# Patient Record
Sex: MALE | Race: BLACK OR AFRICAN AMERICAN | NOT HISPANIC OR LATINO
[De-identification: names, ages, dates, MRNs, and addresses within clinical notes are randomized per-mention and may not be internally consistent; named-entity substitution may affect disease eponyms.]

---

## 2021-06-16 ENCOUNTER — RX RENEWAL (OUTPATIENT)
Age: 71
End: 2021-06-16

## 2021-06-16 PROBLEM — Z00.00 ENCOUNTER FOR PREVENTIVE HEALTH EXAMINATION: Status: ACTIVE | Noted: 2021-06-16

## 2021-06-16 RX ORDER — DICLOFENAC SODIUM 75 MG/1
75 TABLET, DELAYED RELEASE ORAL
Qty: 60 | Refills: 0 | Status: ACTIVE | COMMUNITY
Start: 2021-06-16 | End: 1900-01-01

## 2021-09-09 ENCOUNTER — APPOINTMENT (OUTPATIENT)
Dept: PEDIATRIC ORTHOPEDIC SURGERY | Facility: CLINIC | Age: 71
End: 2021-09-09
Payer: MEDICARE

## 2021-09-09 VITALS — BODY MASS INDEX: 27.55 KG/M2 | WEIGHT: 186 LBS | HEIGHT: 69 IN

## 2021-09-09 DIAGNOSIS — Z82.49 FAMILY HISTORY OF ISCHEMIC HEART DISEASE AND OTHER DISEASES OF THE CIRCULATORY SYSTEM: ICD-10-CM

## 2021-09-09 DIAGNOSIS — M72.2 PLANTAR FASCIAL FIBROMATOSIS: ICD-10-CM

## 2021-09-09 PROCEDURE — 99212 OFFICE O/P EST SF 10 MIN: CPT

## 2021-09-09 PROCEDURE — 73630 X-RAY EXAM OF FOOT: CPT

## 2021-09-09 RX ORDER — MELOXICAM 15 MG/1
15 TABLET ORAL DAILY
Qty: 30 | Refills: 0 | Status: ACTIVE | COMMUNITY
Start: 2021-09-09 | End: 1900-01-01

## 2021-09-09 NOTE — HISTORY OF PRESENT ILLNESS
[FreeTextEntry1] : This 71-year-old male is here for evaluation of a 1 week history of significant pain in the left foot without history of trauma.  The patient does have a history of gout.  His foot though is not red or hot.  He does have some mild diffuse swelling with pain mostly in the plantar aspect of the foot.

## 2021-09-09 NOTE — DATA REVIEWED
[de-identified] : X-ray evaluation of the foot on 9/9/2021 (AP, lateral and oblique views) reveals a calcaneal heel spur with some mild diffuse degenerative changes.\par Indication for x-ray of the foot: To determine presence of arthritis or fracture

## 2021-09-09 NOTE — DATA REVIEWED
[de-identified] : X-ray evaluation of the foot on 9/9/2021 (AP, lateral and oblique views) reveals a calcaneal heel spur with some mild diffuse degenerative changes.\par Indication for x-ray of the foot: To determine presence of arthritis or fracture

## 2021-09-09 NOTE — ASSESSMENT
[FreeTextEntry1] : Plantar fasciitis left foot\par \par This patient will be treated with meloxicam.  If he is not improved in approximately 10 days he will return for possible cortisone injection.

## 2021-09-09 NOTE — PHYSICAL EXAM
[FreeTextEntry1] : On physical examination there is mild swelling in the dorsum of the foot.  There is swelling also in the plantar aspect of the foot with tenderness especially in the plantar aspect of the foot and heel.

## 2021-09-09 NOTE — DATA REVIEWED
[de-identified] : X-ray evaluation of the foot on 9/9/2021 (AP, lateral and oblique views) reveals a calcaneal heel spur with some mild diffuse degenerative changes.\par Indication for x-ray of the foot: To determine presence of arthritis or fracture

## 2021-09-09 NOTE — DATA REVIEWED
[de-identified] : X-ray evaluation of the foot on 9/9/2021 (AP, lateral and oblique views) reveals a calcaneal heel spur with some mild diffuse degenerative changes.\par Indication for x-ray of the foot: To determine presence of arthritis or fracture

## 2021-09-10 PROBLEM — Z82.49 FAMILY HISTORY OF HYPERTENSION: Status: ACTIVE | Noted: 2021-09-09

## 2023-06-27 ENCOUNTER — APPOINTMENT (OUTPATIENT)
Dept: PEDIATRIC ORTHOPEDIC SURGERY | Facility: CLINIC | Age: 73
End: 2023-06-27
Payer: MEDICARE

## 2023-06-27 VITALS — SYSTOLIC BLOOD PRESSURE: 201 MMHG | DIASTOLIC BLOOD PRESSURE: 88 MMHG

## 2023-06-27 VITALS — TEMPERATURE: 97.6 F | HEIGHT: 69 IN | BODY MASS INDEX: 27.55 KG/M2 | WEIGHT: 186 LBS

## 2023-06-27 PROCEDURE — 73630 X-RAY EXAM OF FOOT: CPT

## 2023-06-27 PROCEDURE — 99213 OFFICE O/P EST LOW 20 MIN: CPT

## 2023-06-27 RX ORDER — NAPROXEN 375 MG/1
375 TABLET, DELAYED RELEASE ORAL TWICE DAILY
Qty: 60 | Refills: 1 | Status: ACTIVE | COMMUNITY
Start: 2023-06-27 | End: 1900-01-01

## 2023-06-27 RX ORDER — PROBENECID AND COLCHICINE 500; .5 MG/1; MG/1
0.5-5 TABLET ORAL
Qty: 30 | Refills: 1 | Status: ACTIVE | COMMUNITY
Start: 2023-06-27 | End: 1900-01-01

## 2023-06-27 NOTE — PHYSICAL EXAM
[de-identified] : Exam today reveals a very antalgic gait with limp on the left side.  He walks with an externally rotated gait to avoid pushoff.  He has good motion to the ankle and subtalar joint as well as to his toes but he does have obvious swelling and diffuse pain to the forefoot both dorsally as well as on the sole of the foot.  There is no erythema increased warmth to suggest infection no fluctuance.  Compartments are soft neurovascular status is intact.  The degree of tenderness he has on light palpation is reminiscent of certainly suggestive of a gouty attack.\par \par X-rays ordered and taken today of the left foot reveal minimal degenerative changes no lesion.

## 2023-06-27 NOTE — ASSESSMENT
[FreeTextEntry1] : Impression: Likely acute gout left foot.\par \par This patient has been placed on Naprosyn with GI precautions along with Colcrys.  He is going to [place] and will be back in approximately 10 days I will see him on his return

## 2023-06-27 NOTE — HISTORY OF PRESENT ILLNESS
[de-identified] : This 73-year-old is seen today for evaluation of his left foot.  He was well until 4 days ago when he tripped on a curve sustaining injury.  This caused discomfort and suddenly on Sunday he noted increasing significant pain and swelling to the forefoot both dorsally and plantarly.  This has caused significant painful ambulation it is to be noted he does have a history of gout.  Prior to this he had been doing well.  He does have a history of hypertension though he does not recall the names of his medications.

## 2023-07-07 ENCOUNTER — APPOINTMENT (OUTPATIENT)
Dept: PEDIATRIC ORTHOPEDIC SURGERY | Facility: CLINIC | Age: 73
End: 2023-07-07
Payer: MEDICARE

## 2023-07-07 VITALS
BODY MASS INDEX: 27.55 KG/M2 | WEIGHT: 186 LBS | DIASTOLIC BLOOD PRESSURE: 75 MMHG | SYSTOLIC BLOOD PRESSURE: 150 MMHG | HEIGHT: 69 IN | TEMPERATURE: 96.9 F

## 2023-07-07 DIAGNOSIS — M1A.0720 IDIOPATHIC CHRONIC GOUT, LEFT ANKLE AND FOOT, W/OUT TOPHUS (TOPHI): ICD-10-CM

## 2023-07-07 PROCEDURE — 99212 OFFICE O/P EST SF 10 MIN: CPT

## 2023-07-07 NOTE — HISTORY OF PRESENT ILLNESS
[de-identified] : This 73-year-old returns for follow-up of his left foot.  He is doing significantly better after being placed on gout medication.  He has minimal discomfort if any

## 2023-07-07 NOTE — PHYSICAL EXAM
[de-identified] : His exam reveals a normal gait he has no erythema swelling or increased warmth of the foot moving the ankle subtalar joint and all toes well.

## 2023-07-07 NOTE — ASSESSMENT
[FreeTextEntry1] : Impression: Gout left foot, resolved.\par \par He will finish out his medications return as necessary

## 2023-10-17 ENCOUNTER — APPOINTMENT (OUTPATIENT)
Dept: PEDIATRIC ORTHOPEDIC SURGERY | Facility: CLINIC | Age: 73
End: 2023-10-17
Payer: MEDICARE

## 2023-10-17 VITALS — WEIGHT: 186 LBS | BODY MASS INDEX: 27.55 KG/M2 | HEIGHT: 69 IN | TEMPERATURE: 97.2 F

## 2023-10-17 DIAGNOSIS — M17.0 BILATERAL PRIMARY OSTEOARTHRITIS OF KNEE: ICD-10-CM

## 2023-10-17 PROCEDURE — 99213 OFFICE O/P EST LOW 20 MIN: CPT | Mod: 25

## 2023-10-17 PROCEDURE — 73562 X-RAY EXAM OF KNEE 3: CPT | Mod: 50

## 2023-10-17 PROCEDURE — 20610 DRAIN/INJ JOINT/BURSA W/O US: CPT | Mod: 50,59

## 2023-10-17 RX ORDER — DICLOFENAC SODIUM 75 MG/1
75 TABLET, DELAYED RELEASE ORAL TWICE DAILY
Qty: 60 | Refills: 1 | Status: ACTIVE | COMMUNITY
Start: 2023-10-17 | End: 1900-01-01

## 2024-02-05 RX ORDER — NAPROXEN 375 MG/1
375 TABLET, DELAYED RELEASE ORAL TWICE DAILY
Qty: 60 | Refills: 1 | Status: ACTIVE | COMMUNITY
Start: 2024-02-05 | End: 1900-01-01

## 2024-07-22 ENCOUNTER — APPOINTMENT (OUTPATIENT)
Dept: PEDIATRIC ORTHOPEDIC SURGERY | Facility: CLINIC | Age: 74
End: 2024-07-22
Payer: MEDICARE

## 2024-07-22 VITALS — HEIGHT: 69 IN | WEIGHT: 186 LBS | BODY MASS INDEX: 27.55 KG/M2 | TEMPERATURE: 97 F

## 2024-07-22 DIAGNOSIS — M17.0 BILATERAL PRIMARY OSTEOARTHRITIS OF KNEE: ICD-10-CM

## 2024-07-22 DIAGNOSIS — M25.572 PAIN IN LEFT ANKLE AND JOINTS OF LEFT FOOT: ICD-10-CM

## 2024-07-22 PROCEDURE — 20610 DRAIN/INJ JOINT/BURSA W/O US: CPT | Mod: 50

## 2024-07-22 PROCEDURE — 99213 OFFICE O/P EST LOW 20 MIN: CPT | Mod: 25

## 2024-07-22 PROCEDURE — 73610 X-RAY EXAM OF ANKLE: CPT | Mod: LT

## 2024-07-22 RX ORDER — MELOXICAM 15 MG/1
15 TABLET ORAL
Qty: 30 | Refills: 1 | Status: ACTIVE | COMMUNITY
Start: 2024-07-22 | End: 1900-01-01

## 2024-07-22 NOTE — HISTORY OF PRESENT ILLNESS
[FreeTextEntry1] : This 74-year-old returns for follow-up of his right and left knees he has had recurrent pain stiffness and difficulty ambulating no locking or buckling.  He also has had recent onset of pain to his left ankle with no obvious precipitating event.  His general health has been good since last seen

## 2024-07-22 NOTE — ASSESSMENT
[FreeTextEntry1] : Impression: Symptomatic arthritis both knees,, left ankle pain.  She will be treated with Mobic with GI precautions.  Both knees have been injected uneventfully.  We are seeking authorization for gel injections for his knees.

## 2024-07-22 NOTE — PROCEDURE
[de-identified] : Under sterile technique with a Betadine prep the left knee was injected from a medial portal with 40 mg of methylprednisolone and 2 cc of 1% lidocaine with a Band-Aid dressing applied at the conclusion this was tolerated without incident.  Under sterile technique with a Betadine prep the right knee was injected from a medial portal with 40 mg of methylprednisolone and 2 cc of 1% lidocaine with a Band-Aid dressing applied at the conclusion.  This was tolerated without incident

## 2024-07-22 NOTE — PHYSICAL EXAM
[FreeTextEntry1] : Exam today reveals an antalgic gait on both sides he has moderate effusion to both knees with good motion no instability on stress there is crepitus on both sides pain mainly in the medial compartments.  With regards to the left ankle mild swelling is noted mild restricted motion in dorsiflexion and subtalar motion is mildly restricted as well he is tender about the ankle joint line no instability on stress.

## 2024-07-23 ENCOUNTER — NON-APPOINTMENT (OUTPATIENT)
Age: 74
End: 2024-07-23

## 2024-08-07 ENCOUNTER — APPOINTMENT (OUTPATIENT)
Dept: PEDIATRIC ORTHOPEDIC SURGERY | Facility: CLINIC | Age: 74
End: 2024-08-07

## 2024-08-07 PROCEDURE — 20610 DRAIN/INJ JOINT/BURSA W/O US: CPT | Mod: 50

## 2024-08-07 NOTE — HISTORY OF PRESENT ILLNESS
[de-identified] : This 74-year-old returns for follow-up of arthritis of both knees symptomatic.  He is here for Synvisc 1 injections on both sides.

## 2024-08-07 NOTE — PHYSICAL EXAM
[de-identified] : Exam today antalgic gait on both sides good motion to both knees crepitus and moderate effusions no instability on either side pain mainly in the medial compartments

## 2024-08-07 NOTE — ASSESSMENT
[FreeTextEntry1] : Impression: Bilateral symptomatic arthritis of the knees.  Both knees were injected uneventfully with Synvisc 1.  He will continue conservatively return as necessary

## 2024-08-07 NOTE — PROCEDURE
[FreeTextEntry1] : Under sterile technique with Betadine prep the right knee was injected from the medial portal with 6 mL of Synvisc 1 with a Band-Aid dressing applied at the conclusion this was tolerated without incident.  The lot number for the medication [id number].  Under sterile technique with Betadine prep the left knee was injected from the medial portal with 6 mL of Synvisc 1 with a Band-Aid dressing applied at the conclusion this was tolerated without incident.  The lot number for the medication is [id number]